# Patient Record
Sex: FEMALE | Race: WHITE | NOT HISPANIC OR LATINO | Employment: UNEMPLOYED | URBAN - METROPOLITAN AREA
[De-identification: names, ages, dates, MRNs, and addresses within clinical notes are randomized per-mention and may not be internally consistent; named-entity substitution may affect disease eponyms.]

---

## 2017-08-24 ENCOUNTER — HOSPITAL ENCOUNTER (EMERGENCY)
Facility: HOSPITAL | Age: 37
Discharge: LEFT BEFORE DISCHARGE | End: 2017-08-24
Attending: EMERGENCY MEDICINE
Payer: MEDICAID

## 2017-08-24 VITALS
TEMPERATURE: 97 F | WEIGHT: 115 LBS | BODY MASS INDEX: 20.38 KG/M2 | HEIGHT: 63 IN | HEART RATE: 94 BPM | SYSTOLIC BLOOD PRESSURE: 135 MMHG | OXYGEN SATURATION: 100 % | DIASTOLIC BLOOD PRESSURE: 93 MMHG | RESPIRATION RATE: 16 BRPM

## 2017-08-24 DIAGNOSIS — T40.601A OPIATE OVERDOSE, ACCIDENTAL OR UNINTENTIONAL, INITIAL ENCOUNTER: Primary | ICD-10-CM

## 2017-08-24 DIAGNOSIS — Z78.9 CARDIOPULMONARY RESUSCITATION (CPR)-ONLY RESUSCITATION STATUS: ICD-10-CM

## 2017-08-24 LAB
ALBUMIN SERPL BCP-MCNC: 3.8 G/DL
ALP SERPL-CCNC: 64 U/L
ALT SERPL W/O P-5'-P-CCNC: 44 U/L
ANION GAP SERPL CALC-SCNC: 12 MMOL/L
APAP SERPL-MCNC: <3 UG/ML
AST SERPL-CCNC: 71 U/L
BASOPHILS # BLD AUTO: 0 K/UL
BASOPHILS NFR BLD: 0.4 %
BILIRUB SERPL-MCNC: 0.3 MG/DL
BUN SERPL-MCNC: 27 MG/DL
CALCIUM SERPL-MCNC: 8.8 MG/DL
CHLORIDE SERPL-SCNC: 107 MMOL/L
CO2 SERPL-SCNC: 21 MMOL/L
CREAT SERPL-MCNC: 0.9 MG/DL
DIFFERENTIAL METHOD: ABNORMAL
EOSINOPHIL # BLD AUTO: 0.4 K/UL
EOSINOPHIL NFR BLD: 3.6 %
ERYTHROCYTE [DISTWIDTH] IN BLOOD BY AUTOMATED COUNT: 14 %
EST. GFR  (AFRICAN AMERICAN): >60 ML/MIN/1.73 M^2
EST. GFR  (NON AFRICAN AMERICAN): >60 ML/MIN/1.73 M^2
ETHANOL SERPL-MCNC: 178 MG/DL
GLUCOSE SERPL-MCNC: 69 MG/DL
HCT VFR BLD AUTO: 39.8 %
HGB BLD-MCNC: 13.6 G/DL
LYMPHOCYTES # BLD AUTO: 4.1 K/UL
LYMPHOCYTES NFR BLD: 36.2 %
MCH RBC QN AUTO: 32 PG
MCHC RBC AUTO-ENTMCNC: 34.2 G/DL
MCV RBC AUTO: 94 FL
MONOCYTES # BLD AUTO: 0.5 K/UL
MONOCYTES NFR BLD: 4.2 %
NEUTROPHILS # BLD AUTO: 6.2 K/UL
NEUTROPHILS NFR BLD: 55.6 %
PLATELET # BLD AUTO: 271 K/UL
PMV BLD AUTO: 8.5 FL
POTASSIUM SERPL-SCNC: 4 MMOL/L
PROT SERPL-MCNC: 7.5 G/DL
RBC # BLD AUTO: 4.25 M/UL
SALICYLATES SERPL-MCNC: <5 MG/DL
SODIUM SERPL-SCNC: 140 MMOL/L
WBC # BLD AUTO: 11.2 K/UL

## 2017-08-24 PROCEDURE — 80329 ANALGESICS NON-OPIOID 1 OR 2: CPT

## 2017-08-24 PROCEDURE — 80320 DRUG SCREEN QUANTALCOHOLS: CPT

## 2017-08-24 PROCEDURE — 99284 EMERGENCY DEPT VISIT MOD MDM: CPT

## 2017-08-24 PROCEDURE — 85025 COMPLETE CBC W/AUTO DIFF WBC: CPT

## 2017-08-24 PROCEDURE — 80053 COMPREHEN METABOLIC PANEL: CPT

## 2017-08-24 PROCEDURE — 36415 COLL VENOUS BLD VENIPUNCTURE: CPT

## 2017-08-24 PROCEDURE — 80307 DRUG TEST PRSMV CHEM ANLYZR: CPT

## 2017-08-24 NOTE — ED PROVIDER NOTES
Encounter Date: 8/24/2017       History   No chief complaint on file.    The patient is a 36-year-old female brought in by EMS after 911 was called for unresponsiveness.  According to EMS, the boyfriend at the scene was performing CPR when the police arrived.  The police then continued CPR until EMS arrived.  EMS was able to provide the patient with 2 mg of Narcan which had immediate resolution of her altered mental status.  They do report that during CPR, the patient actually had a bounding pulse.  The patient now states that she is a recreational opiate user and smoked heroin just prior to coming unresponsive.  She denies any suicidal or homicidal ideations.  She denies auditory or visual hallucinations.  She states that she used to use opiates every day.  She now uses a recreationally approximately 2 times per week.  Incidentally, she states that she also drank about a tight of vodka this morning.  She does drink alcohol every day.  At this time, she has no complaints.          Review of patient's allergies indicates:  Allergies not on file  No past medical history on file.  No past surgical history on file.  No family history on file.  Social History   Substance Use Topics    Smoking status: Not on file    Smokeless tobacco: Not on file    Alcohol use Not on file     Review of Systems  General: Denies fever.  Denies chills.  Denies generalized weakness.  HENT: Denies sore throat.  Denies earache.  Denies rhinorrhea.  Eyes: Denies visual changes.  Denies eye pain.  Denies drainage.  Cardiovascular: Denies chest pain.  Denies shortness of breath.  Denies orthopnea.  Denies dyspnea on exertion.  Respiratory: Denies shortness of breath.  Denies wheezing.  Denies coughing.  GI: Denies abdominal pain.  Denies nausea.  Denies vomiting.  Denies diarrhea.  Denies constipation.  Denies melena.  Denies hematochezia.  : Denies dysuria.  Denies hematuria.  Denies pelvic pain.  Denies swelling.  Skin: Denies rashes.   Denies lesions.  Denies pallor.  Neuro: Denies headache.  Denies head trauma.  Denies numbness.  Denies focal weakness.  Musculoskeletal: Denies neck pain.  Denies back pain.  Denies extremity pain.  Denies extremity swelling.  Psych: Denies depression.  Denies suicidal ideation.  Denies homicidal ideation.  Denies auditory hallucinations.  Denies visual hallucinations.      Physical Exam     Initial Vitals   BP Pulse Resp Temp SpO2   -- -- -- -- --      MAP       --         Physical Exam  General: No apparent distress.  Well-nourished.  Well-developed.  Alert and oriented x3.  HENT: Moist mucous membranes.  Normocephalic atraumatic.  Oropharynx clear.  Tympanic membranes clear.  Eyes: Pupils equally round and reactive to light 4 mm to 2 bilaterally.  Extraocular movements intact.  No scleral icterus.  No conjunctival pallor.  Cardiovascular: Regular rate and rhythm.  No murmurs, rubs, or gallops.  Brisk capillary fill.  2+ distal pulses.  Respiratory: Clear to auscultation bilaterally.  No wheezes, rales, or rhonchi.  No respiratory distress.  Abdomen: Soft.  Nontender.  Nondistended.  No guarding.  No rebound.  No masses.  No abdominal bruit auscultated.  Skin: No rashes.  No lesions.  No pallor.  No jaundice.  Neuro: Cranial nerves II through XII grossly intact.  Moving all extremities equally.  No sensory deficits.  Strength 5 out of 5 in all 4 extremities.  Musculoskeletal: Neck supple.  No extremity tenderness.  Moving all extremities without pain.  No back tenderness.  No neck tenderness.    Psych: Good insight.  No obvious response to internal stimuli.  Full affect.      ED Course   Procedures  Labs Reviewed   DRUG SCREEN PANEL, URINE EMERGENCY   ALCOHOL,MEDICAL (ETHANOL)   ACETAMINOPHEN LEVEL   SALICYLATE LEVEL             Medical Decision Making:   Initial Assessment:   This is an emergent evaluation.  The patient presented via EMS alert and oriented with a GCS of 15 and with no complaints.  However, just  prior to arrival, the patient was unresponsive and CPR was in progress.  She reports recreational opiate use.  This was clearly an accidental overdose.  Laboratory studies and a chest x-ray have been ordered.  I will monitor the patient for rebound.  If she remains asymptomatic, I feel the patient can be discharged.  ED Management:  Continuation: Approximately 90 minutes after arrival in the emergency department, the patient absconded.  Throughout the 90 minutes, the patient exhibited no signs of opiate overdose.  She was without complaints, talking on her phone, eating, and walking around in the emergency department.  I do not believe that she was going to exhibit any signs of rebound due to the overdose.  Although the patient's blood alcohol level was somewhat elevated, she was exhibiting no clinical signs of intoxication.  She had no slurring, nystagmus, or ataxia.  She was clearly able to make informed decisions for herself.  Nursing staff and security did attempt to locate the patient without success.                   ED Course     Clinical Impression:     1. Opiate overdose, accidental or unintentional, initial encounter    2. Cardiopulmonary resuscitation (CPR)-only resuscitation status                                 Lyle Blum MD  08/24/17 2911

## 2018-03-23 LAB — B-HCG UR QL: NEGATIVE

## 2023-08-28 ENCOUNTER — HOSPITAL ENCOUNTER (EMERGENCY)
Facility: HOSPITAL | Age: 43
Discharge: HOME | End: 2023-08-28
Attending: EMERGENCY MEDICINE
Payer: MEDICAID

## 2023-08-28 VITALS
WEIGHT: 135 LBS | RESPIRATION RATE: 18 BRPM | TEMPERATURE: 98 F | HEIGHT: 63 IN | HEART RATE: 71 BPM | DIASTOLIC BLOOD PRESSURE: 74 MMHG | OXYGEN SATURATION: 99 % | BODY MASS INDEX: 23.92 KG/M2 | SYSTOLIC BLOOD PRESSURE: 109 MMHG

## 2023-08-28 DIAGNOSIS — M25.539 WRIST PAIN: ICD-10-CM

## 2023-08-28 DIAGNOSIS — J02.9 VIRAL PHARYNGITIS: Primary | ICD-10-CM

## 2023-08-28 LAB
B-HCG UR QL: NEGATIVE
CTP QC/QA: YES
MOLECULAR STREP A: NEGATIVE
POC MOLECULAR INFLUENZA A AGN: NEGATIVE
POC MOLECULAR INFLUENZA B AGN: NEGATIVE
SARS-COV-2 RDRP RESP QL NAA+PROBE: NEGATIVE

## 2023-08-28 PROCEDURE — 87635 SARS-COV-2 COVID-19 AMP PRB: CPT | Performed by: STUDENT IN AN ORGANIZED HEALTH CARE EDUCATION/TRAINING PROGRAM

## 2023-08-28 PROCEDURE — 87502 INFLUENZA DNA AMP PROBE: CPT

## 2023-08-28 PROCEDURE — 87651 STREP A DNA AMP PROBE: CPT

## 2023-08-28 PROCEDURE — 99283 EMERGENCY DEPT VISIT LOW MDM: CPT

## 2023-08-28 PROCEDURE — 81025 URINE PREGNANCY TEST: CPT | Performed by: STUDENT IN AN ORGANIZED HEALTH CARE EDUCATION/TRAINING PROGRAM

## 2023-08-28 NOTE — DISCHARGE INSTRUCTIONS
You were evaluated and treated in the emergency department today. You were found to have a diagnoses that can be managed well at home, however that requires your commitment to getting better.   Problem-Specific Instructions: followup with orthopedics.    Ensure you follow up with your Primary Care Provider or any additional providers listed on this discharge sheet. While you may be healthy enough to go home today, I cannot predict the exact course of your diagnoses. As such, it is your responsibility to monitor symptoms, follow-up with another healthcare provider, or return to the emergency room for new or worsening concerns.   General Maintenance: Ensure adequate hydration to prevent prolonged illness and recovery. Monitor your caloric intake with a goal of obtaining and maintaining a healthy weight to help prevent the development of chronic and life-threatening medical conditions. Start healthy fitness habits and aim for a goal of 30 minutes to an hour of exercise 3-5 times a week. Avoid the use of tobacco, alcohol, and illicit drugs as these may be detrimental to your health goals.

## 2023-08-28 NOTE — ED PROVIDER NOTES
Encounter Date: 8/28/2023       History     Chief Complaint   Patient presents with    Sore Throat     Since this morning. States feels something is stuck in throat. Using mucinex and tylenol. Denies fever.     Hand Pain     RIGHT x several weeks. States numbness/tingling distally intermittently. Denies injury/trauma. Using gabapentin     42-year-old female no significant past medical history presents emergency room for evaluation of multiple complaints.  Primarily patient complains of sore throat since this morning.  No fever or chills.  No dysphagia, dysphonia or dyspnea.  Patient also complaining of bilateral wrist pain with distal paresthesias, patient works on the computer frequently.  She is not been evaluated for this before.  No trauma.      Review of patient's allergies indicates:  No Known Allergies  History reviewed. No pertinent past medical history.  History reviewed. No pertinent surgical history.  History reviewed. No pertinent family history.  Social History     Tobacco Use    Smoking status: Never    Smokeless tobacco: Never   Substance Use Topics    Alcohol use: Never     Review of Systems   Constitutional:  Negative for chills, fatigue and fever.   HENT:  Positive for sore throat. Negative for congestion, hearing loss and trouble swallowing.    Eyes:  Negative for visual disturbance.   Respiratory:  Negative for cough, chest tightness and shortness of breath.    Cardiovascular:  Negative for chest pain.   Gastrointestinal:  Negative for abdominal pain and nausea.   Endocrine: Negative for polyuria.   Genitourinary:  Negative for difficulty urinating.   Musculoskeletal:  Positive for arthralgias. Negative for myalgias.   Skin:  Negative for rash.   Neurological:  Negative for dizziness and headaches.   Psychiatric/Behavioral:  The patient is not nervous/anxious.        Physical Exam     Initial Vitals [08/28/23 1126]   BP Pulse Resp Temp SpO2   109/74 71 18 98.1 °F (36.7 °C) 99 %      MAP       --          Physical Exam    Nursing note and vitals reviewed.  Constitutional: She appears well-developed and well-nourished.   HENT:   Head: Normocephalic and atraumatic.   Eyes: Conjunctivae are normal. Pupils are equal, round, and reactive to light.   Neck: Neck supple.   Normal range of motion.  Cardiovascular:  Normal rate, regular rhythm and normal heart sounds.           Pulmonary/Chest: Breath sounds normal.   Abdominal: Abdomen is soft. She exhibits no distension. There is no abdominal tenderness.   Musculoskeletal:      Cervical back: Normal range of motion and neck supple.      Comments: Exam significant for:  Bilateral Positive Tinel's at the wrist, positive carpal tunnel compression.    Wrist - Otherwise non-tender distal radius/ulna/ulnar styloid/ radial styloid, TFCC, DRUJ, snuffbox. Compartments soft, compressible. FAROM: Flexion: 60, Extension: 60, Supination: 80+, Pronation: 80+, Ulnar deviation: 30, Radial deviation: 20.     Hand - Otherwise non-tender to remainder of hand, wrist and forearm. Non-tender to palpation over Thenar, Hypothenar, and Parona's space. Compartments soft, compressible. FAROM - Able to open and close a fist with all digits.  No rotational deformity. Full opposition. 0 of 4 Kanavel Signs.     Otherwise skin is warm, dry, intact w/o effusions/edema/ erythema/ ecchymosis/ masses/lesions/ obvious deformities/ obvious muscle wasting. Radial Pulses 2+. Capillary Refill <2sec.    Sensation to Light Touch:  L        R  [2/2]  [2/2]  Lateral Shoulder (C5)  [2/2]  [2/2]  Thumb (C6)  [2/2]  [2/2]  Middle finger (C7)  [2/2]  [2/2]  Small finger (C8)  [2/2]  [2/2]  Ulnar Forearm (T1)    Strength testing:   L        R  [5/5]  [5/5]  Shoulder Abduction (C5)  [5/5]  [5/5]  Elbow Flexion (C5/C6)  [5/5]  [5/5]  Wrist Extension (C6)  [5/5]  [5/5]  Elbow Extension (C7)  [5/5]  [5/5]  Finger Flexion (C8)  [5/5]  [5/5]  Finger Abduction (T1)  [5/5]  [5/5]         Neurological: She is alert and  oriented to person, place, and time. GCS score is 15. GCS eye subscore is 4. GCS verbal subscore is 5. GCS motor subscore is 6.   Skin: Skin is warm and dry. Capillary refill takes less than 2 seconds.   Psychiatric: She has a normal mood and affect. Her behavior is normal. Judgment and thought content normal.         ED Course   Procedures  Labs Reviewed   SARS-COV-2 RDRP GENE   POCT INFLUENZA A/B MOLECULAR   POCT STREP A MOLECULAR   POCT URINE PREGNANCY          Imaging Results              X-Ray Wrist Complete Bilateral (Final result)  Result time 08/28/23 11:52:48      Final result by Luis Charles MD (08/28/23 11:52:48)                   Impression:      No convincing evidence of acute fracture or dislocation.      Electronically signed by: Luis Charles  Date:    08/28/2023  Time:    11:52               Narrative:    EXAMINATION:  XR WRIST COMPLETE 3 VIEWS BILATERAL    CLINICAL HISTORY:  Pain in unspecified wrist    TECHNIQUE:  PA, lateral, and oblique views of both wrists were performed.    COMPARISON:  None    FINDINGS:  Right: No evidence of acute fracture or dislocation.  Joint spaces appear maintained.  No radiopaque foreign body.    Left: No evidence of acute fracture or dislocation.  Joint spaces appear maintained.  No radiopaque foreign body.                                       Medications - No data to display  Medical Decision Making  42-year-old female presents for multiple complaints, including sore throat and bilateral wrist pain.  No dysphagia, dysphonia or dyspnea.  ENT exam is unremarkable.  Upper extremity exam is remarkable only for positive Tinel's carpal tunnel compression bilateral wrists.  Plain films were ordered as patient does not have baseline.  I reviewed the images and the radiologist's interpretation.  Negative for acute fracture, dislocation soft tissue injury.  Discussed findings and need for follow-up with orthopedics with the patient.  Regarding her likely viral  pharyngitis, will recommend symptomatic treatment.  Negative strep.  Low suspicion for foreign body or airway obstruction.  Patient is discharged home in stable condition.        Amount and/or Complexity of Data Reviewed  Labs: ordered. Decision-making details documented in ED Course.  Radiology: ordered. Decision-making details documented in ED Course.                               Clinical Impression:   Final diagnoses:  [M25.539] Wrist pain  [J02.9] Viral pharyngitis (Primary)        ED Disposition Condition    Discharge Stable          ED Prescriptions    None       Follow-up Information       Follow up With Specialties Details Why Contact Info    [facility] [place] [facility] Emergency Medicine Go to  As needed, If symptoms worsen [address] [id number] [phone]    Kolby Sinha MD Orthopedic Surgery Schedule an appointment as soon as possible for a visit in 1 week  [address]  [address]  [facility] [address]  [phone]               Luan Botello, PACHASE  08/28/23 7060

## 2025-04-10 DIAGNOSIS — M25.562 PAIN IN LEFT KNEE: Primary | ICD-10-CM

## 2025-04-11 DIAGNOSIS — M25.562 PAIN IN LEFT KNEE: Primary | ICD-10-CM
